# Patient Record
Sex: FEMALE | HISPANIC OR LATINO
[De-identification: names, ages, dates, MRNs, and addresses within clinical notes are randomized per-mention and may not be internally consistent; named-entity substitution may affect disease eponyms.]

---

## 2020-07-23 ENCOUNTER — TELEPHONE (OUTPATIENT)
Dept: SCHEDULING | Age: 30
End: 2020-07-23

## 2021-06-29 ENCOUNTER — WALK IN (OUTPATIENT)
Dept: URGENT CARE | Age: 31
End: 2021-06-29

## 2021-06-29 VITALS
SYSTOLIC BLOOD PRESSURE: 100 MMHG | RESPIRATION RATE: 18 BRPM | HEART RATE: 92 BPM | OXYGEN SATURATION: 99 % | DIASTOLIC BLOOD PRESSURE: 62 MMHG | TEMPERATURE: 99.4 F

## 2021-06-29 DIAGNOSIS — J01.40 ACUTE NON-RECURRENT PANSINUSITIS: Primary | ICD-10-CM

## 2021-06-29 PROCEDURE — X0943 AMG SELF PAY VISIT: HCPCS | Performed by: NURSE PRACTITIONER

## 2021-06-29 RX ORDER — AZITHROMYCIN 250 MG/1
TABLET, FILM COATED ORAL
Qty: 6 TABLET | Refills: 0 | Status: SHIPPED | OUTPATIENT
Start: 2021-06-29

## 2021-06-29 ASSESSMENT — ENCOUNTER SYMPTOMS
HEADACHES: 0
SINUS PRESSURE: 1
SINUS PAIN: 1
RHINORRHEA: 1
COUGH: 1
FEVER: 1
FATIGUE: 1

## 2021-11-18 ENCOUNTER — APPOINTMENT (OUTPATIENT)
Dept: CT IMAGING | Facility: HOSPITAL | Age: 31
End: 2021-11-18
Attending: EMERGENCY MEDICINE
Payer: MEDICAID

## 2021-11-18 ENCOUNTER — HOSPITAL ENCOUNTER (EMERGENCY)
Facility: HOSPITAL | Age: 31
Discharge: HOME | End: 2021-11-18
Attending: EMERGENCY MEDICINE
Payer: MEDICAID

## 2021-11-18 VITALS
OXYGEN SATURATION: 96 % | HEART RATE: 92 BPM | SYSTOLIC BLOOD PRESSURE: 93 MMHG | BODY MASS INDEX: 23.19 KG/M2 | TEMPERATURE: 98 F | DIASTOLIC BLOOD PRESSURE: 43 MMHG | WEIGHT: 126 LBS | RESPIRATION RATE: 16 BRPM | HEIGHT: 62 IN

## 2021-11-18 DIAGNOSIS — H81.12 BENIGN PAROXYSMAL POSITIONAL VERTIGO OF LEFT EAR: ICD-10-CM

## 2021-11-18 DIAGNOSIS — R55 SYNCOPE AND COLLAPSE: Primary | ICD-10-CM

## 2021-11-18 PROCEDURE — 81003 URINALYSIS AUTO W/O SCOPE: CPT | Performed by: EMERGENCY MEDICINE

## 2021-11-18 PROCEDURE — 70450 CT HEAD/BRAIN W/O DYE: CPT | Performed by: EMERGENCY MEDICINE

## 2021-11-18 PROCEDURE — 81025 URINE PREGNANCY TEST: CPT

## 2021-11-18 PROCEDURE — 85379 FIBRIN DEGRADATION QUANT: CPT | Performed by: EMERGENCY MEDICINE

## 2021-11-18 PROCEDURE — 93010 ELECTROCARDIOGRAM REPORT: CPT

## 2021-11-18 PROCEDURE — 84443 ASSAY THYROID STIM HORMONE: CPT | Performed by: EMERGENCY MEDICINE

## 2021-11-18 PROCEDURE — 96361 HYDRATE IV INFUSION ADD-ON: CPT

## 2021-11-18 PROCEDURE — 93005 ELECTROCARDIOGRAM TRACING: CPT

## 2021-11-18 PROCEDURE — 80307 DRUG TEST PRSMV CHEM ANLYZR: CPT | Performed by: EMERGENCY MEDICINE

## 2021-11-18 PROCEDURE — 99284 EMERGENCY DEPT VISIT MOD MDM: CPT

## 2021-11-18 PROCEDURE — 96360 HYDRATION IV INFUSION INIT: CPT

## 2021-11-18 PROCEDURE — 84484 ASSAY OF TROPONIN QUANT: CPT | Performed by: EMERGENCY MEDICINE

## 2021-11-18 PROCEDURE — 85025 COMPLETE CBC W/AUTO DIFF WBC: CPT | Performed by: EMERGENCY MEDICINE

## 2021-11-18 PROCEDURE — 80053 COMPREHEN METABOLIC PANEL: CPT | Performed by: EMERGENCY MEDICINE

## 2021-11-18 RX ORDER — MECLIZINE HYDROCHLORIDE 25 MG/1
25 TABLET ORAL ONCE
Status: COMPLETED | OUTPATIENT
Start: 2021-11-18 | End: 2021-11-18

## 2021-11-18 RX ORDER — MECLIZINE HYDROCHLORIDE 25 MG/1
25 TABLET ORAL 3 TIMES DAILY PRN
Qty: 20 TABLET | Refills: 0 | Status: SHIPPED | OUTPATIENT
Start: 2021-11-18

## 2021-11-18 NOTE — ED INITIAL ASSESSMENT (HPI)
Patient reports she had bronchitis, was walking out of the restroom at her house, starting seeing spots and passed out. Reports the last time she passed out she had a PE. Patient is not on blood thinners.    + weight loss recently, reports she was screen

## 2021-11-19 NOTE — ED PROVIDER NOTES
Patient Seen in: [facility] Emergency Department      History   Patient presents with:  Syncope    Stated Complaint: syncopal, LOC, some pain to forehead    Subjective:   HPI    22-year-old female presents to the emergency department after syncopal Drug use: Not on file             Review of Systems   All other systems reviewed and are negative. Positive for stated complaint: syncopal, LOC, some pain to forehead  Other systems are as noted in HPI. Constitutional and vital signs reviewed. Result Value    Sodium 133 (*)     Anion Gap <0 (*)     AST 13 (*)     All other components within normal limits   URINALYSIS WITH CULTURE REFLEX - Abnormal; Notable for the following components:    Clarity Urine Cloudy (*)     All other components within midline shift. There are no intraparenchymal brain abnormalities. There is nothing specific for acute infarct. There is no hemorrhage or mass lesion. SINUSES:           No sign of acute sinusitis. MASTOIDS:          No sign of acute inflammation.  SHREYAS etiology at this time. She will be given a trial of meclizine and if she has progressive symptoms or does not have improvement she is advised to follow-up with neurology.   She was subsequently discharged                             Disposition and Plan

## 2021-12-28 ENCOUNTER — TELEPHONE (OUTPATIENT)
Dept: SCHEDULING | Age: 31
End: 2021-12-28

## 2021-12-30 ENCOUNTER — HOSPITAL ENCOUNTER (EMERGENCY)
Facility: HOSPITAL | Age: 31
Discharge: HOME | End: 2021-12-30
Attending: EMERGENCY MEDICINE
Payer: MEDICAID

## 2021-12-30 ENCOUNTER — APPOINTMENT (OUTPATIENT)
Dept: GENERAL RADIOLOGY | Facility: HOSPITAL | Age: 31
End: 2021-12-30
Attending: EMERGENCY MEDICINE
Payer: MEDICAID

## 2021-12-30 VITALS
WEIGHT: 130 LBS | DIASTOLIC BLOOD PRESSURE: 59 MMHG | OXYGEN SATURATION: 99 % | BODY MASS INDEX: 23.92 KG/M2 | SYSTOLIC BLOOD PRESSURE: 93 MMHG | TEMPERATURE: 98 F | HEIGHT: 62 IN | HEART RATE: 82 BPM | RESPIRATION RATE: 24 BRPM

## 2021-12-30 DIAGNOSIS — U07.1 COVID-19: Primary | ICD-10-CM

## 2021-12-30 PROCEDURE — 99285 EMERGENCY DEPT VISIT HI MDM: CPT | Performed by: EMERGENCY MEDICINE

## 2021-12-30 PROCEDURE — 99284 EMERGENCY DEPT VISIT MOD MDM: CPT | Performed by: EMERGENCY MEDICINE

## 2021-12-30 PROCEDURE — 71045 X-RAY EXAM CHEST 1 VIEW: CPT | Performed by: EMERGENCY MEDICINE

## 2021-12-30 RX ORDER — ONDANSETRON 4 MG/1
4 TABLET, ORALLY DISINTEGRATING ORAL EVERY 4 HOURS PRN
Qty: 10 TABLET | Refills: 0 | Status: SHIPPED | OUTPATIENT
Start: 2021-12-30 | End: 2022-01-06

## 2021-12-30 RX ORDER — HYDROCODONE BITARTRATE AND ACETAMINOPHEN 5; 325 MG/1; MG/1
1 TABLET ORAL ONCE
Status: COMPLETED | OUTPATIENT
Start: 2021-12-30 | End: 2021-12-30

## 2021-12-30 RX ORDER — HYDROCODONE BITARTRATE AND ACETAMINOPHEN 5; 325 MG/1; MG/1
1-2 TABLET ORAL EVERY 4 HOURS PRN
Qty: 20 TABLET | Refills: 0 | Status: SHIPPED | OUTPATIENT
Start: 2021-12-30 | End: 2022-01-06

## 2021-12-31 NOTE — ED PROVIDER NOTES
Patient Seen in: [facility] Emergency Department      History   Patient presents with:  Covid  Difficulty Breathing    Stated Complaint: covid symptoms x 3 days, + yesterday,    Subjective:   HPI    33-year-old female presents emergency department w gloves were worn throughout the duration of the exam.  Handwashing was performed prior to and after the exam.  Stethoscope and any equipment used during my examination was cleaned with super sani-cloth germicidal wipes following the exam.         Vital sig 10:34 PM       Patient's x-ray was unremarkable. She was feeling better after the [place] so we will send her home with some Zofran some pain medication and she should continue pushing fluids.   Told her hopefully she will feel better in a few days but if sh tablet (4 mg total) by mouth every 4 (four) hours as needed for Nausea.   Qty: 10 tablet Refills: 0

## 2021-12-31 NOTE — ED INITIAL ASSESSMENT (HPI)
Patient here with known exposure to Covid, tested positive yesterday, cough, body aches, headache, dizziness with standing, fever, symptoms started 3 days ago. Taking Tylenol for fevers, last dose 1 hour ago.  Tmax 102.3

## 2024-02-23 ENCOUNTER — TELEPHONE (OUTPATIENT)
Age: 34
End: 2024-02-23

## 2024-02-29 ENCOUNTER — TELEPHONE (OUTPATIENT)
Age: 34
End: 2024-02-29

## 2024-03-02 ENCOUNTER — LAB ENCOUNTER (OUTPATIENT)
Dept: LAB | Age: 34
End: 2024-03-02
Attending: STUDENT IN AN ORGANIZED HEALTH CARE EDUCATION/TRAINING PROGRAM
Payer: COMMERCIAL

## 2024-03-02 DIAGNOSIS — H02.9 EYELID ABNORMALITY: ICD-10-CM

## 2024-03-02 DIAGNOSIS — F41.9 ANXIETY: ICD-10-CM

## 2024-03-02 DIAGNOSIS — Z13.220 SCREENING CHOLESTEROL LEVEL: ICD-10-CM

## 2024-03-02 LAB
ALBUMIN SERPL-MCNC: 4 G/DL (ref 3.4–5)
ALBUMIN/GLOB SERPL: 1.3 {RATIO} (ref 1–2)
ALP LIVER SERPL-CCNC: 70 U/L
ALT SERPL-CCNC: 17 U/L
ANION GAP SERPL CALC-SCNC: 3 MMOL/L (ref 0–18)
AST SERPL-CCNC: 13 U/L (ref 15–37)
BASOPHILS # BLD AUTO: 0.04 X10(3) UL (ref 0–0.2)
BASOPHILS NFR BLD AUTO: 0.8 %
BILIRUB SERPL-MCNC: 0.9 MG/DL (ref 0.1–2)
BUN BLD-MCNC: 9 MG/DL (ref 9–23)
CALCIUM BLD-MCNC: 9.1 MG/DL (ref 8.5–10.1)
CHLORIDE SERPL-SCNC: 109 MMOL/L (ref 98–112)
CHOLEST SERPL-MCNC: 201 MG/DL (ref ?–200)
CO2 SERPL-SCNC: 27 MMOL/L (ref 21–32)
CREAT BLD-MCNC: 0.69 MG/DL
EGFRCR SERPLBLD CKD-EPI 2021: 119 ML/MIN/1.73M2 (ref 60–?)
EOSINOPHIL # BLD AUTO: 0.12 X10(3) UL (ref 0–0.7)
EOSINOPHIL NFR BLD AUTO: 2.5 %
ERYTHROCYTE [DISTWIDTH] IN BLOOD BY AUTOMATED COUNT: 12.8 %
FASTING PATIENT LIPID ANSWER: NO
FASTING STATUS PATIENT QL REPORTED: NO
GLOBULIN PLAS-MCNC: 3.2 G/DL (ref 2.8–4.4)
GLUCOSE BLD-MCNC: 88 MG/DL (ref 70–99)
HCT VFR BLD AUTO: 39.5 %
HDLC SERPL-MCNC: 69 MG/DL (ref 40–59)
HGB BLD-MCNC: 12.8 G/DL
IMM GRANULOCYTES # BLD AUTO: 0 X10(3) UL (ref 0–1)
IMM GRANULOCYTES NFR BLD: 0 %
LDLC SERPL CALC-MCNC: 115 MG/DL (ref ?–100)
LYMPHOCYTES # BLD AUTO: 1.72 X10(3) UL (ref 1–4)
LYMPHOCYTES NFR BLD AUTO: 35.9 %
MCH RBC QN AUTO: 30.3 PG (ref 26–34)
MCHC RBC AUTO-ENTMCNC: 32.4 G/DL (ref 31–37)
MCV RBC AUTO: 93.6 FL
MONOCYTES # BLD AUTO: 0.49 X10(3) UL (ref 0.1–1)
MONOCYTES NFR BLD AUTO: 10.2 %
NEUTROPHILS # BLD AUTO: 2.42 X10 (3) UL (ref 1.5–7.7)
NEUTROPHILS # BLD AUTO: 2.42 X10(3) UL (ref 1.5–7.7)
NEUTROPHILS NFR BLD AUTO: 50.6 %
NONHDLC SERPL-MCNC: 132 MG/DL (ref ?–130)
OSMOLALITY SERPL CALC.SUM OF ELEC: 286 MOSM/KG (ref 275–295)
PLATELET # BLD AUTO: 299 10(3)UL (ref 150–450)
POTASSIUM SERPL-SCNC: 3.8 MMOL/L (ref 3.5–5.1)
PROT SERPL-MCNC: 7.2 G/DL (ref 6.4–8.2)
RBC # BLD AUTO: 4.22 X10(6)UL
SODIUM SERPL-SCNC: 139 MMOL/L (ref 136–145)
TRIGL SERPL-MCNC: 98 MG/DL (ref 30–149)
TSI SER-ACNC: 3.52 MIU/ML (ref 0.36–3.74)
VLDLC SERPL CALC-MCNC: 17 MG/DL (ref 0–30)
WBC # BLD AUTO: 4.8 X10(3) UL (ref 4–11)

## 2024-03-02 PROCEDURE — 84443 ASSAY THYROID STIM HORMONE: CPT

## 2024-03-02 PROCEDURE — 36415 COLL VENOUS BLD VENIPUNCTURE: CPT

## 2024-03-02 PROCEDURE — 80061 LIPID PANEL: CPT

## 2024-03-02 PROCEDURE — 85025 COMPLETE CBC W/AUTO DIFF WBC: CPT

## 2024-03-02 PROCEDURE — 80053 COMPREHEN METABOLIC PANEL: CPT

## 2024-03-04 ENCOUNTER — TELEPHONE (OUTPATIENT)
Age: 34
End: 2024-03-04

## 2024-03-05 ENCOUNTER — TELEPHONE (OUTPATIENT)
Age: 34
End: 2024-03-05

## 2024-03-05 NOTE — TELEPHONE ENCOUNTER
Lala Barajas  [address] [place] [address]  Phone: [phone]    Marge Tenorio  [address]  [place] [address]  Phone: [phone]    Gris Jensen   [address]  [place] [address]  Phone: [phone]    Aaliyah Sanchez  [address]  [place] [address]  Phone: [phone]    Vipul Youssef  [address]  [place] [address]  Phone: [phone]

## 2024-04-15 DIAGNOSIS — F41.9 ANXIETY: ICD-10-CM

## 2024-04-15 RX ORDER — BUSPIRONE HYDROCHLORIDE 5 MG/1
5 TABLET ORAL 2 TIMES DAILY
Qty: 60 TABLET | Refills: 0 | OUTPATIENT
Start: 2024-04-15

## 2024-04-15 NOTE — TELEPHONE ENCOUNTER
Requesting Buspirone 5mg BID  Last OV: 3/4/24  RTC: 1 month  Last Rx'd 2/17/24 #60 with 0 refills    No future appointments.    Rx was increased to 10mg on 3/4/24. Request denied.

## 2024-08-28 ENCOUNTER — HOSPITAL ENCOUNTER (EMERGENCY)
Age: 34
Discharge: HOME | End: 2024-08-28
Attending: STUDENT IN AN ORGANIZED HEALTH CARE EDUCATION/TRAINING PROGRAM

## 2024-08-28 ENCOUNTER — APPOINTMENT (OUTPATIENT)
Dept: GENERAL RADIOLOGY | Age: 34
End: 2024-08-28
Attending: EMERGENCY MEDICINE

## 2024-08-28 ENCOUNTER — APPOINTMENT (OUTPATIENT)
Dept: CT IMAGING | Age: 34
End: 2024-08-28
Attending: STUDENT IN AN ORGANIZED HEALTH CARE EDUCATION/TRAINING PROGRAM

## 2024-08-28 ENCOUNTER — WALK IN (OUTPATIENT)
Dept: URGENT CARE | Age: 34
End: 2024-08-28
Attending: EMERGENCY MEDICINE

## 2024-08-28 VITALS
DIASTOLIC BLOOD PRESSURE: 67 MMHG | RESPIRATION RATE: 16 BRPM | SYSTOLIC BLOOD PRESSURE: 102 MMHG | HEART RATE: 96 BPM | OXYGEN SATURATION: 99 % | WEIGHT: 128 LBS | TEMPERATURE: 98.1 F

## 2024-08-28 VITALS
OXYGEN SATURATION: 100 % | BODY MASS INDEX: 23.55 KG/M2 | TEMPERATURE: 99.2 F | HEART RATE: 96 BPM | RESPIRATION RATE: 18 BRPM | SYSTOLIC BLOOD PRESSURE: 90 MMHG | DIASTOLIC BLOOD PRESSURE: 58 MMHG | HEIGHT: 62 IN | WEIGHT: 128 LBS

## 2024-08-28 DIAGNOSIS — R10.13 EPIGASTRIC PAIN: ICD-10-CM

## 2024-08-28 DIAGNOSIS — Z20.822 SUSPECTED COVID-19 VIRUS INFECTION: Primary | ICD-10-CM

## 2024-08-28 DIAGNOSIS — K29.70 GASTRITIS WITHOUT BLEEDING, UNSPECIFIED CHRONICITY, UNSPECIFIED GASTRITIS TYPE: ICD-10-CM

## 2024-08-28 DIAGNOSIS — R10.13 EPIGASTRIC PAIN: Primary | ICD-10-CM

## 2024-08-28 DIAGNOSIS — R50.9 FEVER, UNSPECIFIED FEVER CAUSE: ICD-10-CM

## 2024-08-28 LAB
ALBUMIN SERPL-MCNC: 3.5 G/DL (ref 3.6–5.1)
ALBUMIN/GLOB SERPL: 1.1 {RATIO} (ref 1–2.4)
ALP SERPL-CCNC: 70 UNITS/L (ref 45–117)
ALT SERPL-CCNC: 15 UNITS/L
ANION GAP SERPL CALC-SCNC: 9 MMOL/L (ref 7–19)
APPEARANCE UR: CLEAR
AST SERPL-CCNC: 13 UNITS/L
B-HCG UR QL: NEGATIVE
BASOPHILS # BLD: 0 K/MCL (ref 0–0.3)
BASOPHILS NFR BLD: 1 %
BILIRUB SERPL-MCNC: 0.2 MG/DL (ref 0.2–1)
BILIRUB UR QL STRIP: NEGATIVE
BUN SERPL-MCNC: 7 MG/DL (ref 6–20)
BUN/CREAT SERPL: 8 (ref 7–25)
CALCIUM SERPL-MCNC: 8.6 MG/DL (ref 8.4–10.2)
CHLORIDE SERPL-SCNC: 110 MMOL/L (ref 97–110)
CO2 SERPL-SCNC: 26 MMOL/L (ref 21–32)
COLOR UR: NORMAL
CREAT SERPL-MCNC: 0.85 MG/DL (ref 0.51–0.95)
DEPRECATED RDW RBC: 41.7 FL (ref 39–50)
EGFRCR SERPLBLD CKD-EPI 2021: >90 ML/MIN/{1.73_M2}
EOSINOPHIL # BLD: 0.1 K/MCL (ref 0–0.5)
EOSINOPHIL NFR BLD: 1 %
ERYTHROCYTE [DISTWIDTH] IN BLOOD: 12.5 % (ref 11–15)
FASTING DURATION TIME PATIENT: ABNORMAL H
FLUAV RNA RESP QL NAA+PROBE: NOT DETECTED
FLUBV RNA RESP QL NAA+PROBE: NOT DETECTED
GLOBULIN SER-MCNC: 3.2 G/DL (ref 2–4)
GLUCOSE SERPL-MCNC: 91 MG/DL (ref 70–99)
GLUCOSE UR STRIP-MCNC: NEGATIVE MG/DL
HCT VFR BLD CALC: 33.7 % (ref 36–46.5)
HGB BLD-MCNC: 11.3 G/DL (ref 12–15.5)
HGB UR QL STRIP: NEGATIVE
IMM GRANULOCYTES # BLD AUTO: 0 K/MCL (ref 0–0.2)
IMM GRANULOCYTES # BLD: 0 %
KETONES UR STRIP-MCNC: NEGATIVE MG/DL
LEUKOCYTE ESTERASE UR QL STRIP: NEGATIVE
LIPASE SERPL-CCNC: 60 UNITS/L (ref 15–77)
LYMPHOCYTES # BLD: 1.5 K/MCL (ref 1–4.8)
LYMPHOCYTES NFR BLD: 24 %
MCH RBC QN AUTO: 30.6 PG (ref 26–34)
MCHC RBC AUTO-ENTMCNC: 33.5 G/DL (ref 32–36.5)
MCV RBC AUTO: 91.3 FL (ref 78–100)
MONOCYTES # BLD: 0.7 K/MCL (ref 0.3–0.9)
MONOCYTES NFR BLD: 12 %
NEUTROPHILS # BLD: 3.8 K/MCL (ref 1.8–7.7)
NEUTROPHILS NFR BLD: 62 %
NITRITE UR QL STRIP: NEGATIVE
NRBC BLD MANUAL-RTO: 0 /100 WBC
PH UR STRIP: 6.5 [PH] (ref 5–7)
PLATELET # BLD AUTO: 242 K/MCL (ref 140–450)
POTASSIUM SERPL-SCNC: 3.8 MMOL/L (ref 3.4–5.1)
PROT SERPL-MCNC: 6.7 G/DL (ref 6.4–8.2)
PROT UR STRIP-MCNC: NEGATIVE MG/DL
RBC # BLD: 3.69 MIL/MCL (ref 4–5.2)
RSV AG NPH QL IA.RAPID: NOT DETECTED
SARS-COV-2 RNA RESP QL NAA+PROBE: NOT DETECTED
SODIUM SERPL-SCNC: 141 MMOL/L (ref 135–145)
SP GR UR STRIP: 1.02 (ref 1–1.03)
UROBILINOGEN UR STRIP-MCNC: 0.2 MG/DL
WBC # BLD: 6.1 K/MCL (ref 4.2–11)

## 2024-08-28 PROCEDURE — 81025 URINE PREGNANCY TEST: CPT | Performed by: EMERGENCY MEDICINE

## 2024-08-28 PROCEDURE — 74177 CT ABD & PELVIS W/CONTRAST: CPT

## 2024-08-28 PROCEDURE — 81003 URINALYSIS AUTO W/O SCOPE: CPT | Performed by: EMERGENCY MEDICINE

## 2024-08-28 PROCEDURE — 96361 HYDRATE IV INFUSION ADD-ON: CPT

## 2024-08-28 PROCEDURE — 10002800 HB RX 250 W HCPCS: Performed by: STUDENT IN AN ORGANIZED HEALTH CARE EDUCATION/TRAINING PROGRAM

## 2024-08-28 PROCEDURE — 80053 COMPREHEN METABOLIC PANEL: CPT | Performed by: STUDENT IN AN ORGANIZED HEALTH CARE EDUCATION/TRAINING PROGRAM

## 2024-08-28 PROCEDURE — 0241U POCT COVID/FLU/RSV PANEL: CPT | Performed by: EMERGENCY MEDICINE

## 2024-08-28 PROCEDURE — 10002807 HB RX 258: Performed by: STUDENT IN AN ORGANIZED HEALTH CARE EDUCATION/TRAINING PROGRAM

## 2024-08-28 PROCEDURE — 71046 X-RAY EXAM CHEST 2 VIEWS: CPT

## 2024-08-28 PROCEDURE — 96374 THER/PROPH/DIAG INJ IV PUSH: CPT

## 2024-08-28 PROCEDURE — 10002805 HB CONTRAST AGENT: Performed by: STUDENT IN AN ORGANIZED HEALTH CARE EDUCATION/TRAINING PROGRAM

## 2024-08-28 PROCEDURE — 10002801 HB RX 250 W/O HCPCS: Performed by: STUDENT IN AN ORGANIZED HEALTH CARE EDUCATION/TRAINING PROGRAM

## 2024-08-28 PROCEDURE — 99285 EMERGENCY DEPT VISIT HI MDM: CPT

## 2024-08-28 PROCEDURE — 96375 TX/PRO/DX INJ NEW DRUG ADDON: CPT

## 2024-08-28 PROCEDURE — 85025 COMPLETE CBC W/AUTO DIFF WBC: CPT | Performed by: STUDENT IN AN ORGANIZED HEALTH CARE EDUCATION/TRAINING PROGRAM

## 2024-08-28 PROCEDURE — 83690 ASSAY OF LIPASE: CPT | Performed by: STUDENT IN AN ORGANIZED HEALTH CARE EDUCATION/TRAINING PROGRAM

## 2024-08-28 RX ORDER — KETOROLAC TROMETHAMINE 15 MG/ML
15 INJECTION, SOLUTION INTRAMUSCULAR; INTRAVENOUS ONCE
Status: COMPLETED | OUTPATIENT
Start: 2024-08-28 | End: 2024-08-28

## 2024-08-28 RX ORDER — ONDANSETRON 2 MG/ML
4 INJECTION INTRAMUSCULAR; INTRAVENOUS ONCE
Status: COMPLETED | OUTPATIENT
Start: 2024-08-28 | End: 2024-08-28

## 2024-08-28 RX ORDER — BUSPIRONE HYDROCHLORIDE 10 MG/1
TABLET ORAL
COMMUNITY

## 2024-08-28 RX ORDER — FAMOTIDINE 10 MG/ML
20 INJECTION, SOLUTION INTRAVENOUS ONCE
Status: COMPLETED | OUTPATIENT
Start: 2024-08-28 | End: 2024-08-28

## 2024-08-28 RX ORDER — FAMOTIDINE 40 MG/1
40 TABLET, FILM COATED ORAL DAILY
Qty: 30 TABLET | Refills: 0 | Status: SHIPPED | OUTPATIENT
Start: 2024-08-28 | End: 2024-09-27

## 2024-08-28 RX ADMIN — FAMOTIDINE 20 MG: 10 INJECTION INTRAVENOUS at 16:33

## 2024-08-28 RX ADMIN — IOHEXOL 75 ML: 350 INJECTION, SOLUTION INTRAVENOUS at 17:44

## 2024-08-28 RX ADMIN — KETOROLAC TROMETHAMINE 15 MG: 15 INJECTION, SOLUTION INTRAMUSCULAR; INTRAVENOUS at 16:32

## 2024-08-28 RX ADMIN — Medication 15 ML: at 18:50

## 2024-08-28 RX ADMIN — ONDANSETRON 4 MG: 2 INJECTION INTRAMUSCULAR; INTRAVENOUS at 16:30

## 2024-08-28 RX ADMIN — SODIUM CHLORIDE 1000 ML: 9 INJECTION, SOLUTION INTRAVENOUS at 16:28

## 2024-08-28 ASSESSMENT — ENCOUNTER SYMPTOMS
NAUSEA: 1
FEVER: 1
DIARRHEA: 1
ABDOMINAL PAIN: 1
COUGH: 1

## 2024-08-28 ASSESSMENT — PAIN SCALES - GENERAL
PAINLEVEL_OUTOF10: 7
PAINLEVEL_OUTOF10: 8
PAINLEVEL: 7

## 2024-08-28 ASSESSMENT — PAIN SCALES - WONG BAKER: WONGBAKER_NUMERICALRESPONSE: 7

## 2024-08-29 LAB
RAINBOW EXTRA TUBES HOLD SPECIMEN: NORMAL
RAINBOW EXTRA TUBES HOLD SPECIMEN: NORMAL

## 2024-09-05 ENCOUNTER — OFFICE VISIT (OUTPATIENT)
Dept: FAMILY MEDICINE CLINIC | Facility: CLINIC | Age: 34
End: 2024-09-05
Payer: COMMERCIAL

## 2024-09-05 VITALS
SYSTOLIC BLOOD PRESSURE: 102 MMHG | HEIGHT: 62 IN | WEIGHT: 137 LBS | DIASTOLIC BLOOD PRESSURE: 70 MMHG | HEART RATE: 62 BPM | RESPIRATION RATE: 16 BRPM | BODY MASS INDEX: 25.21 KG/M2 | TEMPERATURE: 97 F | OXYGEN SATURATION: 97 %

## 2024-09-05 DIAGNOSIS — E86.0 DEHYDRATION: ICD-10-CM

## 2024-09-05 DIAGNOSIS — D21.9 FIBROIDS: ICD-10-CM

## 2024-09-05 DIAGNOSIS — K29.00 ACUTE GASTRITIS WITHOUT HEMORRHAGE, UNSPECIFIED GASTRITIS TYPE: Primary | ICD-10-CM

## 2024-09-05 DIAGNOSIS — J01.90 ACUTE NON-RECURRENT SINUSITIS, UNSPECIFIED LOCATION: ICD-10-CM

## 2024-09-05 DIAGNOSIS — M54.9 ACUTE MID BACK PAIN: ICD-10-CM

## 2024-09-05 DIAGNOSIS — K76.9 LIVER LESION: ICD-10-CM

## 2024-09-05 DIAGNOSIS — M62.830 PARASPINAL MUSCLE SPASM: ICD-10-CM

## 2024-09-05 PROCEDURE — 3074F SYST BP LT 130 MM HG: CPT | Performed by: STUDENT IN AN ORGANIZED HEALTH CARE EDUCATION/TRAINING PROGRAM

## 2024-09-05 PROCEDURE — 99214 OFFICE O/P EST MOD 30 MIN: CPT | Performed by: STUDENT IN AN ORGANIZED HEALTH CARE EDUCATION/TRAINING PROGRAM

## 2024-09-05 PROCEDURE — 3008F BODY MASS INDEX DOCD: CPT | Performed by: STUDENT IN AN ORGANIZED HEALTH CARE EDUCATION/TRAINING PROGRAM

## 2024-09-05 PROCEDURE — 3078F DIAST BP <80 MM HG: CPT | Performed by: STUDENT IN AN ORGANIZED HEALTH CARE EDUCATION/TRAINING PROGRAM

## 2024-09-05 RX ORDER — CYCLOBENZAPRINE HCL 5 MG
5 TABLET ORAL 3 TIMES DAILY PRN
Qty: 30 TABLET | Refills: 0 | Status: SHIPPED | OUTPATIENT
Start: 2024-09-05 | End: 2024-09-12

## 2024-09-05 RX ORDER — ACETAMINOPHEN AND CODEINE PHOSPHATE 300; 60 MG/1; MG/1
1 TABLET ORAL EVERY 6 HOURS PRN
Qty: 20 TABLET | Refills: 0 | Status: SHIPPED | OUTPATIENT
Start: 2024-09-05

## 2024-09-05 RX ORDER — PANTOPRAZOLE SODIUM 40 MG/1
40 TABLET, DELAYED RELEASE ORAL
Qty: 60 TABLET | Refills: 0 | Status: SHIPPED | OUTPATIENT
Start: 2024-09-05 | End: 2024-11-04

## 2024-09-05 NOTE — PROGRESS NOTES
Subjective:      Chief Complaint   Patient presents with    ER F/U     Went to [facility] and was informed might be GERD  States she has been sick for over a month and it doesn't go away - states right now she has a cough, fevers on and off, nasal congestion. Some days she is unable to eat and some vomiting. States she is constantly sick.    Back Pain     Left side back pain     HISTORY OF PRESENT ILLNESS  HPI  HPI obtained per patient report.  Giuliana Oconnor is a pleasant 33 year old female presenting for follow-up after a recent ER visit.   She states that she has had epigastric pain, nausea, back pain, fatigue, fever, nasal congestion, cough, LH, and FOX for over 1 month.   She reports that her epigastric pain is not associated with eating, but her nausea is triggered by eating. She denies any vomiting, heartburn, or stool changes. She has only been taking peptobismol without adequate relief.   She reports L mid back tenderness to palpation. She states that she has been taking ibuprofen frequently and for an extended period of time for her back pain.  She notes that her nasal discharge and sputum has been thick and clear or yellow.   She was seen at [facility] ER on 8/28/24. CXR was negative, but CT abdomen/pelvis showed thickening of the lower esophagus, indeterminate liver lesions, and possible uterine fibroids.       PAST PATIENT HISTORY  Past Medical History:    Anxiety    Pulmonary embolism (HCC)     Past Surgical History:   Procedure Laterality Date    Appendectomy      Eye surgery      cataract    Removal gallbladder         CURRENT MEDICATIONS  Outpatient Medications Marked as Taking for the 9/5/24 encounter (Office Visit) with Billy Biswas MD   Medication Sig Dispense Refill    pantoprazole 40 MG Oral Tab EC Take 1 tablet (40 mg total) by mouth every morning before breakfast. 60 tablet 0    amoxicillin clavulanate 875-125 MG Oral Tab Take 1 tablet by mouth 2 (two) times daily for 7 days. 14 tablet 0     acetaminophen-codeine 300-60 MG Oral Tab Take 1 tablet by mouth every 6 (six) hours as needed for Pain. 20 tablet 0    cyclobenzaprine 5 MG Oral Tab Take 1 tablet (5 mg total) by mouth 3 (three) times daily as needed for Muscle spasms. 30 tablet 0    busPIRone 10 MG Oral Tab Take 1 tablet (10 mg total) by mouth in the morning and 1 tablet (10 mg total) before bedtime. 180 tablet 3    ALPRAZolam (XANAX) 0.25 MG Oral Tab Take 1 tablet (0.25 mg total) by mouth daily as needed for Anxiety. 30 tablet 3    hydrOXYzine 25 MG Oral Tab Take 1 tablet (25 mg total) by mouth nightly as needed for Anxiety. 90 tablet 3    Multiple Vitamin (MULTIVITAMIN ADULT OR) Take 1 tablet by mouth daily.         HEALTH MAINTENANCE  Immunization History   Administered Date(s) Administered    >=3 YRS TRI  MULTIDOSE VIAL (24980) FLU CLINIC 10/24/2008    Covid-19 Vaccine Pfizer 30 mcg/0.3 ml 09/13/2021, 11/03/2021    DTAP 07/04/2013    Influenza 10/27/2015, 10/14/2016    TDAP 10/27/2015, 10/25/2016    Td, Preserv Free 03/28/2007       ALLERGIES AND DRUG REACTIONS  No Known Allergies    Family History   Problem Relation Age of Onset    Other (Melanoma) Mother     Other (Melanoma) Maternal Grandmother     Asthma Maternal Grandmother     Diabetes Maternal Grandmother     Cancer Maternal Grandmother         Lung cancer    Breast Cancer Paternal Grandmother      Social History     Socioeconomic History    Marital status:    Tobacco Use    Smoking status: Never    Smokeless tobacco: Never   Vaping Use    Vaping status: Never Used   Substance and Sexual Activity    Alcohol use: Not Currently     Comment: once every 3 months    Drug use: Never     Social Determinants of Health     Financial Resource Strain: Not on File (10/6/2022)    Received from [facility]    Financial Resource Strain     Financial Resource Strain: 0   Food Insecurity: Not on File (5/30/2023)    Received from [facility]    Food Insecurity     Food: 0   Transportation Needs: Not on  File (5/30/2023)    Received from [facility]    Transportation Needs     Transportation: 0   Physical Activity: Not on File (10/6/2022)    Received from [facility]    Physical Activity     Physical Activity: 0   Stress: Not on File (10/6/2022)    Received from [facility]    Stress     Stress: 0   Social Connections: Not on File (10/6/2022)    Received from [facility]    Social Connections     Social Connections and Isolation: 0    [facility]       Review of Systems   Constitutional:  Positive for fatigue and fever.   HENT:  Positive for congestion.    Respiratory:  Positive for cough and shortness of breath.    Gastrointestinal:  Positive for abdominal pain and nausea.   Musculoskeletal:  Positive for back pain.   Neurological:  Positive for light-headedness.   All other systems reviewed and are negative.         Objective:      /70   Pulse 62   Temp 97.3 °F (36.3 °C) (Temporal)   Resp 16   Ht 5' 2\" (1.575 m)   Wt 137 lb (62.1 kg)   LMP 08/14/2024 (Exact Date)   SpO2 97%   BMI 25.06 kg/m²   Body mass index is 25.06 kg/m².    Physical Exam  Vitals reviewed.   Constitutional:       General: She is not in acute distress.     Appearance: She is not ill-appearing, toxic-appearing or diaphoretic.   HENT:      Head: Normocephalic and atraumatic.      Mouth/Throat:      Mouth: Mucous membranes are moist.      Pharynx: Oropharynx is clear. No oropharyngeal exudate or posterior oropharyngeal erythema.   Eyes:      General: No scleral icterus.        Right eye: No discharge.         Left eye: No discharge.      Extraocular Movements: Extraocular movements intact.      Conjunctiva/sclera: Conjunctivae normal.   Cardiovascular:      Rate and Rhythm: Normal rate and regular rhythm.      Heart sounds: Normal heart sounds.   Pulmonary:      Effort: Pulmonary effort is normal.      Breath sounds: Normal breath sounds.   Abdominal:      General: Abdomen is flat. Bowel sounds  are normal. There is no distension.      Palpations: Abdomen is soft. There is no mass.      Tenderness: There is no abdominal tenderness. There is no guarding or rebound.      Hernia: No hernia is present.   Musculoskeletal:         General: Tenderness (L thoracic paraspinal spasm with tenderness) present. No swelling or deformity. Normal range of motion.      Cervical back: Neck supple. No tenderness.      Right lower leg: No edema.      Left lower leg: No edema.   Lymphadenopathy:      Cervical: Cervical adenopathy (mild submandibular LAD) present.   Neurological:      Mental Status: She is alert and oriented to person, place, and time.   Psychiatric:         Mood and Affect: Mood normal.            Assessment and Plan:      1. Acute gastritis without hemorrhage, unspecified gastritis type (Primary)  -     Pantoprazole Sodium; Take 1 tablet (40 mg total) by mouth every morning before breakfast.  Dispense: 60 tablet; Refill: 0  2. Dehydration  3. Liver lesion  -     US ABDOMEN LIMITED (CPT=76705); Future; Expected date: 09/05/2024  4. Fibroids  -     OBG - INTERNAL  5. Acute mid back pain  -     Acetaminophen-Codeine; Take 1 tablet by mouth every 6 (six) hours as needed for Pain.  Dispense: 20 tablet; Refill: 0  -     Cyclobenzaprine HCl; Take 1 tablet (5 mg total) by mouth 3 (three) times daily as needed for Muscle spasms.  Dispense: 30 tablet; Refill: 0  6. Paraspinal muscle spasm  -     Acetaminophen-Codeine; Take 1 tablet by mouth every 6 (six) hours as needed for Pain.  Dispense: 20 tablet; Refill: 0  -     Cyclobenzaprine HCl; Take 1 tablet (5 mg total) by mouth 3 (three) times daily as needed for Muscle spasms.  Dispense: 30 tablet; Refill: 0  7. Acute non-recurrent sinusitis, unspecified location  -     Amoxicillin-Pot Clavulanate; Take 1 tablet by mouth 2 (two) times daily for 7 days.  Dispense: 14 tablet; Refill: 0    Return if symptoms worsen or fail to improve.    Gastritis  - with possible  esophagitis  - possibly exacerbated by her recent frequent and extended course of ibuprofen   - recommended pantoprazole as prescribed for an 8 week course  - she is likely experiencing lightheadedness due to dehydration. Recommended increasing her fluid intake  - she declined zofran or reglan for her nausea at this time, as she reports experiencing headaches when taking these medications    Indeterminate liver lesions  - present on CT  - recommended liver US for further evaluation     Uterine fibroids  - she is experiencing irregular vaginal bleeding and cramping during menstrual periods and does not wish to take OCPs  - will refer to Gyne     Thoracic paraspinal spasm/strain  - recommended discontinuation of NSAIDs given her current gastritis  - recommended local heat application and gentle stretching  - we discussed that she may take tylenol or tylenol-3 as needed for pain  - we discussed that she may take flexeril as needed for muscle spasm  - discussed the R/B/A of tylenol-3 and flexeril including possible dizziness and drowsiness and recommended against driving while taking these medications    Sinusitis  - prolonged symptoms  - recommended a course of augmentin as prescribed    Patient verbalized understanding of assessment and recommendations. All questions and concerns were addressed.    Electronically signed by Billy Biswas MD

## 2024-09-06 ENCOUNTER — TELEPHONE (OUTPATIENT)
Dept: FAMILY MEDICINE CLINIC | Facility: CLINIC | Age: 34
End: 2024-09-06

## 2024-09-25 ENCOUNTER — HOSPITAL ENCOUNTER (OUTPATIENT)
Dept: ULTRASOUND IMAGING | Age: 34
Discharge: HOME | End: 2024-09-25
Attending: STUDENT IN AN ORGANIZED HEALTH CARE EDUCATION/TRAINING PROGRAM
Payer: COMMERCIAL

## 2024-09-25 DIAGNOSIS — K76.9 LIVER LESION: ICD-10-CM

## 2024-09-25 PROCEDURE — 76700 US EXAM ABDOM COMPLETE: CPT | Performed by: STUDENT IN AN ORGANIZED HEALTH CARE EDUCATION/TRAINING PROGRAM

## 2024-09-26 ENCOUNTER — PATIENT MESSAGE (OUTPATIENT)
Dept: FAMILY MEDICINE CLINIC | Facility: CLINIC | Age: 34
End: 2024-09-26

## 2024-09-26 NOTE — PROGRESS NOTES
Duglas Giordano,     Your liver ultrasound showed 2 small cysts, appearing consistent with hemangiomas which are benign cysts. No further evaluation is required for these findings. Please continue with our current plan of care as we discussed during your visit.     Dr. Biswas

## 2024-09-27 NOTE — TELEPHONE ENCOUNTER
From: Giuliana Oconnor  To: Billy Biswas  Sent: 9/26/2024 5:26 PM CDT  Subject: Cysts    That’s good to hear! Now as far as my stomach goes. Will I get an endoscopy of my stomach for the lesions as well?    Quality 226: Preventive Care And Screening: Tobacco Use: Screening And Cessation Intervention: Patient screened for tobacco use and is an ex/non-smoker Quality 130: Documentation Of Current Medications In The Medical Record: Current Medications Documented Detail Level: Detailed Quality 431: Preventive Care And Screening: Unhealthy Alcohol Use - Screening: Patient not identified as an unhealthy alcohol user when screened for unhealthy alcohol use using a systematic screening method

## 2024-11-26 ENCOUNTER — TELEMEDICINE (OUTPATIENT)
Dept: FAMILY MEDICINE CLINIC | Facility: CLINIC | Age: 34
End: 2024-11-26
Payer: COMMERCIAL

## 2024-11-26 DIAGNOSIS — J02.9 SORE THROAT: Primary | ICD-10-CM

## 2024-11-26 PROCEDURE — 99213 OFFICE O/P EST LOW 20 MIN: CPT | Performed by: FAMILY MEDICINE

## 2024-11-26 PROCEDURE — G2211 COMPLEX E/M VISIT ADD ON: HCPCS | Performed by: FAMILY MEDICINE

## 2024-11-26 RX ORDER — AZITHROMYCIN 250 MG/1
TABLET, FILM COATED ORAL
Qty: 6 TABLET | Refills: 0 | Status: SHIPPED | OUTPATIENT
Start: 2024-11-26 | End: 2024-12-01

## 2024-11-26 NOTE — PROGRESS NOTES
Subjective:   Patient ID: Giuliana Oconnor is a 33 year old female.    HPI  Ms. Oconnor is a pleasant 33-year-old female with history of anxiety presenting for video visit today for sore throat which started yesterday.  This is associated with cough productive of phlegm and fever.  She feels tired.  She noticed white spots in the back of her throat.  She has had strep throat in the past and feels that this is the case.  Her children also was seen by other doctors and was diagnosed with strep and are on antibiotics.  She does not report nausea, vomiting, abdominal pain, diarrhea.    I had reviewed past medical and family histories together with allergy and medication lists documented.    History/Other:   Review of Systems   Gastrointestinal:  Negative for abdominal pain, nausea and vomiting.     Current Outpatient Medications   Medication Sig Dispense Refill    azithromycin (ZITHROMAX Z-NENITA) 250 MG Oral Tab Take 2 tablets (500 mg total) by mouth daily for 1 day, THEN 1 tablet (250 mg total) daily for 4 days. 6 tablet 0    acetaminophen-codeine 300-60 MG Oral Tab Take 1 tablet by mouth every 6 (six) hours as needed for Pain. 20 tablet 0    busPIRone 10 MG Oral Tab Take 1 tablet (10 mg total) by mouth in the morning and 1 tablet (10 mg total) before bedtime. 180 tablet 3    benzoyl peroxide 5 % External Gel Apply 1 Application topically 2 (two) times daily. (Patient not taking: Reported on 9/5/2024) 60 g 3    ALPRAZolam (XANAX) 0.25 MG Oral Tab Take 1 tablet (0.25 mg total) by mouth daily as needed for Anxiety. 30 tablet 3    hydrOXYzine 25 MG Oral Tab Take 1 tablet (25 mg total) by mouth nightly as needed for Anxiety. 90 tablet 3    Multiple Vitamin (MULTIVITAMIN ADULT OR) Take 1 tablet by mouth daily.       Allergies:Allergies[1]    Objective:   Physical Exam  Constitutional:       General: She is not in acute distress.  Neurological:      Mental Status: She is alert.         Assessment & Plan:   1. Sore throat     -Secondary to pharyngitis, viral versus bacterial  - May gargle with salt and water  - May take Tylenol or ibuprofen as needed for fever pain  - Keep hydrated  - Go to the emergency room if with respiratory distress and/or severe dehydration  - Will go ahead and treat with Z-Nenita which was sent to her pharmacy  - Call or come in sooner if symptoms worsen or persist    This note was prepared using Dragon Medical voice recognition dictation software. As a result errors may occur. When identified these errors have been corrected. While every attempt is made to correct errors during dictation discrepancies may still exist            No orders of the defined types were placed in this encounter.      Meds This Visit:  Requested Prescriptions     Signed Prescriptions Disp Refills    azithromycin (ZITHROMAX Z-NENITA) 250 MG Oral Tab 6 tablet 0     Sig: Take 2 tablets (500 mg total) by mouth daily for 1 day, THEN 1 tablet (250 mg total) daily for 4 days.       Imaging & Referrals:  None         [1] No Known Allergies

## 2025-02-10 ENCOUNTER — TELEPHONE (OUTPATIENT)
Dept: FAMILY MEDICINE CLINIC | Facility: CLINIC | Age: 35
End: 2025-02-10

## 2025-02-10 NOTE — TELEPHONE ENCOUNTER
Erica Trivedi masked at check-in for Urgent Care visit on 01/30/20.   Spoke to patient.   Was diagnosed with pneumonia on 2/3/2025.   Was given antibiotics, steroids, inhaler and nebulizer medication.  Patient continues to have cough, chest congestion, tiredness and fever.   Patient states fever has been present since Friday. Tmax 103.6 on Saturday. Has been alternating q4 tylenol and ibuprofen. Today fever is at 101.something.  Patient requesting a follow up appointment.    Schedule is full this week.   Did you want to double book her some time this week?

## 2025-02-10 NOTE — TELEPHONE ENCOUNTER
Patient went to  and has pneumonia, has had a fever since Friday, today is 100.1 would like an appointment this week with Dr. Biswas, hoping she could squeeze in a sick visit.

## 2025-02-10 NOTE — TELEPHONE ENCOUNTER
Future Appointments   Date Time Provider Department Center   2/11/2025  9:00 AM Billy Biswas MD [facility] [address] [facility] [address]     Patient scheduled appointment

## 2025-02-10 NOTE — TELEPHONE ENCOUNTER
Yes, please offer double-booked appointment. Any time today other than 2-5 would be okay. It would also be okay to double-book tomorrow any time other than 9:30, 11, 11:30, or 1:30

## 2025-02-11 ENCOUNTER — HOSPITAL ENCOUNTER (OUTPATIENT)
Dept: CT IMAGING | Age: 35
End: 2025-02-11
Attending: STUDENT IN AN ORGANIZED HEALTH CARE EDUCATION/TRAINING PROGRAM
Payer: COMMERCIAL

## 2025-02-11 ENCOUNTER — TELEPHONE (OUTPATIENT)
Dept: FAMILY MEDICINE CLINIC | Facility: CLINIC | Age: 35
End: 2025-02-11

## 2025-02-11 ENCOUNTER — OFFICE VISIT (OUTPATIENT)
Dept: FAMILY MEDICINE CLINIC | Facility: CLINIC | Age: 35
End: 2025-02-11
Payer: COMMERCIAL

## 2025-02-11 ENCOUNTER — HOSPITAL ENCOUNTER (OUTPATIENT)
Dept: GENERAL RADIOLOGY | Age: 35
Discharge: HOME | End: 2025-02-11
Attending: STUDENT IN AN ORGANIZED HEALTH CARE EDUCATION/TRAINING PROGRAM
Payer: COMMERCIAL

## 2025-02-11 ENCOUNTER — HOSPITAL ENCOUNTER (OUTPATIENT)
Dept: CT IMAGING | Age: 35
Discharge: HOME | End: 2025-02-11
Attending: STUDENT IN AN ORGANIZED HEALTH CARE EDUCATION/TRAINING PROGRAM
Payer: COMMERCIAL

## 2025-02-11 ENCOUNTER — MOBILE ENCOUNTER (OUTPATIENT)
Dept: FAMILY MEDICINE CLINIC | Facility: CLINIC | Age: 35
End: 2025-02-11

## 2025-02-11 VITALS
OXYGEN SATURATION: 99 % | HEART RATE: 100 BPM | RESPIRATION RATE: 18 BRPM | SYSTOLIC BLOOD PRESSURE: 110 MMHG | DIASTOLIC BLOOD PRESSURE: 70 MMHG | WEIGHT: 143.13 LBS | HEIGHT: 62 IN | BODY MASS INDEX: 26.34 KG/M2 | TEMPERATURE: 100 F

## 2025-02-11 DIAGNOSIS — R06.02 SOB (SHORTNESS OF BREATH): ICD-10-CM

## 2025-02-11 DIAGNOSIS — R13.10 DYSPHAGIA, UNSPECIFIED TYPE: Primary | ICD-10-CM

## 2025-02-11 DIAGNOSIS — R50.9 FEVER, UNSPECIFIED FEVER CAUSE: ICD-10-CM

## 2025-02-11 DIAGNOSIS — R07.9 CHEST PAIN, UNSPECIFIED TYPE: ICD-10-CM

## 2025-02-11 DIAGNOSIS — E86.0 DEHYDRATION: ICD-10-CM

## 2025-02-11 DIAGNOSIS — R13.10 DYSPHAGIA, UNSPECIFIED TYPE: ICD-10-CM

## 2025-02-11 DIAGNOSIS — M94.0 COSTOCHONDRITIS: ICD-10-CM

## 2025-02-11 DIAGNOSIS — Z86.711 HISTORY OF PULMONARY EMBOLUS (PE): ICD-10-CM

## 2025-02-11 DIAGNOSIS — J02.9 SORE THROAT: ICD-10-CM

## 2025-02-11 LAB
CONTROL LINE PRESENT WITH A CLEAR BACKGROUND (YES/NO): YES YES/NO
KIT LOT #: NORMAL NUMERIC
STREP GRP A CUL-SCR: NEGATIVE

## 2025-02-11 PROCEDURE — 71046 X-RAY EXAM CHEST 2 VIEWS: CPT | Performed by: STUDENT IN AN ORGANIZED HEALTH CARE EDUCATION/TRAINING PROGRAM

## 2025-02-11 PROCEDURE — 70491 CT SOFT TISSUE NECK W/DYE: CPT | Performed by: STUDENT IN AN ORGANIZED HEALTH CARE EDUCATION/TRAINING PROGRAM

## 2025-02-11 PROCEDURE — 71275 CT ANGIOGRAPHY CHEST: CPT | Performed by: STUDENT IN AN ORGANIZED HEALTH CARE EDUCATION/TRAINING PROGRAM

## 2025-02-11 PROCEDURE — 3074F SYST BP LT 130 MM HG: CPT | Performed by: STUDENT IN AN ORGANIZED HEALTH CARE EDUCATION/TRAINING PROGRAM

## 2025-02-11 PROCEDURE — 3008F BODY MASS INDEX DOCD: CPT | Performed by: STUDENT IN AN ORGANIZED HEALTH CARE EDUCATION/TRAINING PROGRAM

## 2025-02-11 PROCEDURE — 87880 STREP A ASSAY W/OPTIC: CPT | Performed by: STUDENT IN AN ORGANIZED HEALTH CARE EDUCATION/TRAINING PROGRAM

## 2025-02-11 PROCEDURE — 99214 OFFICE O/P EST MOD 30 MIN: CPT | Performed by: STUDENT IN AN ORGANIZED HEALTH CARE EDUCATION/TRAINING PROGRAM

## 2025-02-11 PROCEDURE — 3078F DIAST BP <80 MM HG: CPT | Performed by: STUDENT IN AN ORGANIZED HEALTH CARE EDUCATION/TRAINING PROGRAM

## 2025-02-11 RX ORDER — AZITHROMYCIN 250 MG/1
TABLET, FILM COATED ORAL
COMMUNITY
Start: 2025-02-03

## 2025-02-11 RX ORDER — BENZONATATE 100 MG/1
100 CAPSULE ORAL 3 TIMES DAILY PRN
Qty: 30 CAPSULE | Refills: 0 | Status: SHIPPED | OUTPATIENT
Start: 2025-02-11 | End: 2025-02-21

## 2025-02-11 RX ORDER — ALBUTEROL SULFATE 90 UG/1
INHALANT RESPIRATORY (INHALATION)
COMMUNITY
Start: 2025-02-03

## 2025-02-11 RX ORDER — NAPROXEN 500 MG/1
500 TABLET ORAL 2 TIMES DAILY PRN
Qty: 14 TABLET | Refills: 0 | Status: SHIPPED | OUTPATIENT
Start: 2025-02-11 | End: 2025-02-18

## 2025-02-11 RX ORDER — PREDNISONE 20 MG/1
40 TABLET ORAL DAILY
COMMUNITY
Start: 2025-02-03

## 2025-02-11 NOTE — PROGRESS NOTES
Subjective:      Chief Complaint   Patient presents with    Pneumonia     Had fever since last Friday - has been continuously been taking medication - hard to breath - hard to swallow - states on Friday she was coughing up blood - does have chest pain - states also gets dizzy     HISTORY OF PRESENT ILLNESS  HPI  HPI obtained per patient report.  Giuliana Oconnor is a pleasant 34 year old female presenting with acute symptoms.   She was diagnosed with PNA on 2/3/25 at urgent care and prescribed azithromycin, prednisone, and albuterol nebulizer treatments. She states that she was tested for strep pharyngitis, Covid-19, influenza, and RSV at that time and was negative.   On 2/7, her symptoms began to worsen. Currently she reports fever, chills, cough productive of blood-tinged sputum, sore throat, dysphagia, SOB, CP, and dizziness. She has had limited oral tolerance of fluids/food due to dysphagia. Her chest pain is intermittent, brought on by deep inhalation and coughing, and described as sharp or tightness.     PAST PATIENT HISTORY  Past Medical History:    Anxiety    Pulmonary embolism (HCC)     Past Surgical History:   Procedure Laterality Date    Appendectomy      Eye surgery      cataract    Removal gallbladder         CURRENT MEDICATIONS  Medications Taking[1]    HEALTH MAINTENANCE  Immunization History   Administered Date(s) Administered    >=3 YRS TRI  MULTIDOSE VIAL (47535) FLU CLINIC 10/24/2008    Covid-19 Vaccine Pfizer 30 mcg/0.3 ml 09/13/2021, 11/03/2021    DTAP 07/04/2013    Influenza 10/27/2015, 10/14/2016    TDAP 10/27/2015, 10/25/2016    Td, Preserv Free 03/28/2007       ALLERGIES AND DRUG REACTIONS  Allergies[2]    Family History   Problem Relation Age of Onset    Other (Melanoma) Mother     Other (Melanoma) Maternal Grandmother     Asthma Maternal Grandmother     Diabetes Maternal Grandmother     Cancer Maternal Grandmother         Lung cancer    Breast Cancer Paternal Grandmother       Social History     Socioeconomic History    Marital status:    Tobacco Use    Smoking status: Never    Smokeless tobacco: Never   Vaping Use    Vaping status: Never Used   Substance and Sexual Activity    Alcohol use: Not Currently     Comment: once every 3 months    Drug use: Never     Social Drivers of Health     Food Insecurity: Not on File (5/30/2023)    Received from [facility]    Food Insecurity     Food: 0   Transportation Needs: Not on File (5/30/2023)    Received from [facility]    Transportation Needs     Transportation: 0   Stress: Not on File (10/6/2022)    Received from [facility]    Stress     Stress: 0    [facility]       Review of Systems   Constitutional:  Positive for chills and fever.   HENT:  Positive for sore throat.    Respiratory:  Positive for cough and shortness of breath.    Cardiovascular:  Positive for chest pain.   Neurological:  Positive for dizziness.   All other systems reviewed and are negative.         Objective:      /70   Pulse 100   Temp 100.2 °F (37.9 °C) (Oral)   Resp 18   Ht 5' 2\" (1.575 m)   Wt 143 lb 2 oz (64.9 kg)   LMP 01/11/2025 (Approximate)   SpO2 99%   BMI 26.18 kg/m²   Body mass index is 26.18 kg/m².    Physical Exam  Vitals reviewed.   Constitutional:       General: She is not in acute distress.     Appearance: She is not ill-appearing, toxic-appearing or diaphoretic.   HENT:      Head: Normocephalic and atraumatic.      Nose: Nose normal.      Mouth/Throat:      Mouth: Mucous membranes are moist.      Pharynx: Oropharynx is clear. Posterior oropharyngeal erythema (mild pharyngeal erythema) present. No oropharyngeal exudate.   Eyes:      General: No scleral icterus.        Right eye: No discharge.         Left eye: No discharge.      Extraocular Movements: Extraocular movements intact.      Conjunctiva/sclera: Conjunctivae normal.   Cardiovascular:      Rate and Rhythm: Normal rate and  regular rhythm.      Heart sounds: Normal heart sounds.   Pulmonary:      Effort: Pulmonary effort is normal.      Breath sounds: Normal breath sounds.   Chest:      Chest wall: Tenderness (tenderness along sternocostal joints) present.   Abdominal:      General: Abdomen is flat. There is no distension.      Tenderness: There is no abdominal tenderness.   Musculoskeletal:      Cervical back: Neck supple. Tenderness present.      Right lower leg: No edema.      Left lower leg: No edema.   Lymphadenopathy:      Cervical: Cervical adenopathy (tender B/L submandibular and anterior cervical LAD) present.   Skin:     General: Skin is warm and dry.      Coloration: Skin is not jaundiced or pale.      Findings: No bruising, erythema or rash.   Neurological:      Mental Status: She is alert and oriented to person, place, and time.            Assessment and Plan:      1. Dysphagia, unspecified type (Primary)  -     CT SOFT TISSUE OF NECK (CPT=70490); Future; Expected date: 02/11/2025  2. Dehydration  3. Sore throat  -     Strep A Assay W/Optic  4. SOB (shortness of breath)  -     XR CHEST PA + LAT CHEST (CPT=71046); Future; Expected date: 02/11/2025  -     CT SOFT TISSUE OF NECK (CPT=70490); Future; Expected date: 02/11/2025  -     CTA CHEST (CPT=71275); Future; Expected date: 02/11/2025  5. Chest pain, unspecified type  -     CTA CHEST (CPT=71275); Future; Expected date: 02/11/2025  6. Costochondritis  7. Fever, unspecified fever cause  -     CTA CHEST (CPT=71275); Future; Expected date: 02/11/2025  8. History of pulmonary embolus (PE)  -     CTA CHEST (CPT=71275); Future; Expected date: 02/11/2025    Return if symptoms worsen or fail to improve.    - rapid strep negative  - recommended STAT CXR and CT neck for further evaluation including for possible worsening PNA and to R/O neck abscess  - her CXR result was normal. Due to her history of PE recommended STAT CTA chest for further evaluation      Patient verbalized  understanding of assessment and recommendations. All questions and concerns were addressed.    Electronically signed by Billy Biswas MD         [1]   Outpatient Medications Marked as Taking for the 2/11/25 encounter (Office Visit) with Billy Biswsa MD   Medication Sig Dispense Refill    azithromycin 250 MG Oral Tab       albuterol 108 (90 Base) MCG/ACT Inhalation Aero Soln INHALE 1 PUFF BY MOUTH EVERY 4 TO 6 HOURS FOR 10 DAYS AS NEEDED FOR SHORTNESS OF BREATH OR WHEEZING      predniSONE 20 MG Oral Tab Take 2 tablets (40 mg total) by mouth daily.      ALPRAZolam (XANAX) 0.25 MG Oral Tab Take 1 tablet (0.25 mg total) by mouth daily as needed for Anxiety. 30 tablet 3    hydrOXYzine 25 MG Oral Tab Take 1 tablet (25 mg total) by mouth nightly as needed for Anxiety. 90 tablet 3    Multiple Vitamin (MULTIVITAMIN ADULT OR) Take 1 tablet by mouth daily.     [2] No Known Allergies

## 2025-02-11 NOTE — TELEPHONE ENCOUNTER
Received call from Janna in radiology regarding STAT results below:      PROCEDURE:  XR CHEST PA + LAT CHEST     CONCLUSION:  No acute cardiopulmonary process.

## 2025-02-11 NOTE — TELEPHONE ENCOUNTER
Order placed for CT soft tissue of neck with contrast.   Attempted to contact radiology- no answer.

## 2025-02-11 NOTE — PROGRESS NOTES
Duglas Giordano,     Your chest x-ray was unexpectedly normal. Because of your chest pain and shortness of breath symptoms, I would recommend a CTA of your chest for further evaluation. Please schedule this test through the same process as for your neck CT, and let me know if you are unable to complete this scan today.     Dr. Biswas

## 2025-02-11 NOTE — TELEPHONE ENCOUNTER
Future Appointments   Date Time Provider Department Center   2/11/2025  5:00 PM PF CT RM1 PF [facility]   2/11/2025  5:30 PM PF CT RM1 PF CT [facility]     Radiology calling, patient having CT soft tissue of neck. Recommends test with contrast, patient having test today.

## 2025-02-12 NOTE — PROGRESS NOTES
I spoke with the pt by phone regarding her stat CT results. CT neck showed signs of tonsillitis. Recommended taking augmentin as prescribed. We discussed that her symptoms are expected to start improving with the first 2-3 days of the antibiotic course. PRN naproxen and tessalon perles were  also sent for her. If her symptoms do not improve as noted above, if symptoms worsen, or if new symptoms develop, recommended seeking further care in the ER

## 2025-02-16 ENCOUNTER — PATIENT MESSAGE (OUTPATIENT)
Dept: FAMILY MEDICINE CLINIC | Facility: CLINIC | Age: 35
End: 2025-02-16

## 2025-02-18 NOTE — TELEPHONE ENCOUNTER
I would recommend evaluation in the ER, as she likely needs stat imaging and possible IV medications

## 2025-02-25 ENCOUNTER — TELEPHONE (OUTPATIENT)
Dept: OTOLARYNGOLOGY | Facility: CLINIC | Age: 35
End: 2025-02-25

## 2025-03-31 ENCOUNTER — TELEPHONE (OUTPATIENT)
Dept: FAMILY MEDICINE CLINIC | Facility: CLINIC | Age: 35
End: 2025-03-31

## 2025-03-31 NOTE — TELEPHONE ENCOUNTER
Spoke to patient  States she had vomiting / diarrhea this past Thursday 3/27/25.  Went to  who said it was norovirus.   Patient said symptoms continued all weekend and yesterday she developed terrible pain in lower abdomen/lower back after bending over.   Went to ER ([facility]) and was told it was a UTI. Prescribed cephalexin.     Patient states symptoms are worse today. Can barely walk due to the pain.   Has pain in bilateral lower/mid back. Urine is dark. Barely urinating. Has n/v. No longer has diarrhea. Difficulty drinking water since she is afraid she will vomit.    Patient is sweating and has chills. Does not know if she has fever since she does not have thermometer.     Advised patient to go to ER to be evaluated again due to worsening symptoms/pain. Needs to push fluids due to dehydration. Patient verbalized understanding.

## 2025-03-31 NOTE — TELEPHONE ENCOUNTER
Appointment for: Giuliana Oconnor ([id number])   Visit type: MYCHART EXAM (2964)   4/11/2025 12:00 PM (30 minutes) with Billy Biswas in [facility] [address]      Patient comments:   UTI     OK to wait?

## 2025-04-03 ENCOUNTER — TELEMEDICINE (OUTPATIENT)
Dept: FAMILY MEDICINE CLINIC | Facility: CLINIC | Age: 35
End: 2025-04-03
Payer: COMMERCIAL

## 2025-04-03 ENCOUNTER — TELEPHONE (OUTPATIENT)
Dept: FAMILY MEDICINE CLINIC | Facility: CLINIC | Age: 35
End: 2025-04-03

## 2025-04-03 DIAGNOSIS — K76.9 LIVER LESION: ICD-10-CM

## 2025-04-03 DIAGNOSIS — N30.00 ACUTE CYSTITIS WITHOUT HEMATURIA: ICD-10-CM

## 2025-04-03 DIAGNOSIS — M54.50 ACUTE BILATERAL LOW BACK PAIN, UNSPECIFIED WHETHER SCIATICA PRESENT: Primary | ICD-10-CM

## 2025-04-03 PROCEDURE — 98006 SYNCH AUDIO-VIDEO EST MOD 30: CPT | Performed by: FAMILY MEDICINE

## 2025-04-03 RX ORDER — NITROFURANTOIN 25; 75 MG/1; MG/1
100 CAPSULE ORAL 2 TIMES DAILY
Qty: 14 CAPSULE | Refills: 0 | Status: SHIPPED | OUTPATIENT
Start: 2025-04-03 | End: 2025-04-10

## 2025-04-03 NOTE — PROGRESS NOTES
Subjective:   Patient ID: Giuliana Oconnor is a 34 year old female.    HPI  Ms. Oconnor is a pleasant 34-year-old female with history of anxiety presenting today for low back pain.  She is doing a video visit today.  She was seen at the emergency room on 3/30/2025 for back pain associated with urinary frequency, incomplete bladder emptying.  Ultrasound of the pelvis was done which showed ovarian follicle.  CT scan of the abdomen and pelvis was done which showed no acute findings.  There was a new hepatic lesion noted on the right lobe.  Urine culture showed multiple angel.  She was sent home on cephalexin.  However she continues to have symptoms.  No fever no chills no nausea no vomiting.    I had reviewed past medical and family histories together with allergy and medication lists documented.    History/Other:   Review of Systems   Constitutional:  Negative for fatigue and fever.   Respiratory:  Negative for cough and shortness of breath.    Cardiovascular:  Negative for chest pain.   Gastrointestinal:  Negative for diarrhea, nausea and vomiting.     Current Outpatient Medications   Medication Sig Dispense Refill    nitrofurantoin monohydrate macro 100 MG Oral Cap Take 1 capsule (100 mg total) by mouth 2 (two) times daily for 7 days. 14 capsule 0    azithromycin 250 MG Oral Tab       albuterol 108 (90 Base) MCG/ACT Inhalation Aero Soln INHALE 1 PUFF BY MOUTH EVERY 4 TO 6 HOURS FOR 10 DAYS AS NEEDED FOR SHORTNESS OF BREATH OR WHEEZING      predniSONE 20 MG Oral Tab Take 2 tablets (40 mg total) by mouth daily.      acetaminophen-codeine 300-60 MG Oral Tab Take 1 tablet by mouth every 6 (six) hours as needed for Pain. (Patient not taking: Reported on 2/11/2025) 20 tablet 0    busPIRone 10 MG Oral Tab Take 1 tablet (10 mg total) by mouth in the morning and 1 tablet (10 mg total) before bedtime. (Patient not taking: Reported on 2/11/2025) 180 tablet 3    benzoyl peroxide 5 % External Gel Apply 1 Application  topically 2 (two) times daily. (Patient not taking: Reported on 2/11/2025) 60 g 3    ALPRAZolam (XANAX) 0.25 MG Oral Tab Take 1 tablet (0.25 mg total) by mouth daily as needed for Anxiety. 30 tablet 3    hydrOXYzine 25 MG Oral Tab Take 1 tablet (25 mg total) by mouth nightly as needed for Anxiety. 90 tablet 3    Multiple Vitamin (MULTIVITAMIN ADULT OR) Take 1 tablet by mouth daily.       Allergies:Allergies[1]    Objective:   Physical Exam  Constitutional:       General: She is not in acute distress.  Neurological:      Mental Status: She is alert.   Psychiatric:         Mood and Affect: Mood normal.         Behavior: Behavior normal.         Assessment & Plan:   1. Acute bilateral low back pain, unspecified whether sciatica present    2. Acute cystitis without hematuria    3. Liver lesion    -Keep hydrated  - Will change cephalexin to Macrobid 1 tablet twice a day for 7 days  - May take Tylenol as needed for fever pain  - Will repeat urinalysis and urine culture  - With regards to new liver lesion we will proceed with ordering abdominal ultrasound  - Call or come us if symptoms worsen or persist      This note was prepared using Dragon [employer] voice recognition dictation software. As a result errors may occur. When identified these errors have been corrected. While every attempt is made to correct errors during dictation discrepancies may still exist            Orders Placed This Encounter   Procedures    Urinalysis, Routine [E]    Urine Culture, Routine [E]       Meds This Visit:  Requested Prescriptions     Signed Prescriptions Disp Refills    nitrofurantoin monohydrate macro 100 MG Oral Cap 14 capsule 0     Sig: Take 1 capsule (100 mg total) by mouth 2 (two) times daily for 7 days.       Imaging & Referrals:  US ABDOMEN COMPLETE (CPT=76700)         [1] No Known Allergies

## 2025-04-04 ENCOUNTER — TELEPHONE (OUTPATIENT)
Dept: FAMILY MEDICINE CLINIC | Facility: CLINIC | Age: 35
End: 2025-04-04

## 2025-04-04 NOTE — TELEPHONE ENCOUNTER
Patient called and said she spoke with Dr. Russ yesterday over a video and he ordered and ultrasound and a CT of the neck.  She has no idea why he ordered the CT of the neck.  The tech at [place] said it doesn't make sense.  She wants to know if he still wants her to have it for some reason?

## 2025-04-04 NOTE — TELEPHONE ENCOUNTER
Spoke to patient.   States she already knew neck CT order is an error/duplicate.   She was calling to ask if Dr. Russ was going to order abdominal CT for kidney pain along with ultrasound of abdomen for liver lesion.   Advised patient message would be sent to PCP for clarification.   Patient states she will already be in ER by then due to pain.   Patient states she can barely walk due to pain in back/kidney area.     Advised patient to go to ER now due to pain.   Patient verbalized understanding and said she will go.

## 2025-04-04 NOTE — TELEPHONE ENCOUNTER
Spoke with patient initially at 6:10 pm. Patient was at radiology. Had a telehealth visit today and was told an order for a CT of the abdomen had been placed. When she arrived, no CT order was on file. There was an order for an abdominal ultrasound. I told the patient I was on the [place] and did not have access to a computer. I forwarded the message to her PCP for clarification.  At 7 pm, I called the patient back and let her know I was unable to make contact with her PCP. Upon review of the chart, the patient had been seen by Dr. Russ which she did not tell me during our first phone call. I told her I reviewed today's note and only see the order for the abdominal ultrasound. She stated her back pain was getting worse and she didn't not what to do. I told if the pain is not tolerable, she should be seen in the ER. Otherwise, she should call the office in the morning and confirm her radiology orders. Patient voiced understanding of these instructions.

## 2025-04-11 ENCOUNTER — OFFICE VISIT (OUTPATIENT)
Facility: CLINIC | Age: 35
End: 2025-04-11
Payer: COMMERCIAL

## 2025-04-11 ENCOUNTER — LAB ENCOUNTER (OUTPATIENT)
Dept: LAB | Age: 35
End: 2025-04-11
Payer: COMMERCIAL

## 2025-04-11 VITALS
WEIGHT: 148 LBS | HEIGHT: 62 IN | DIASTOLIC BLOOD PRESSURE: 68 MMHG | SYSTOLIC BLOOD PRESSURE: 102 MMHG | BODY MASS INDEX: 27.23 KG/M2

## 2025-04-11 DIAGNOSIS — N64.4 BREAST PAIN, LEFT: ICD-10-CM

## 2025-04-11 DIAGNOSIS — N94.10 DYSPAREUNIA IN FEMALE: ICD-10-CM

## 2025-04-11 DIAGNOSIS — Z86.19 HISTORY OF INFECTION DUE TO HUMAN PAPILLOMA VIRUS (HPV): ICD-10-CM

## 2025-04-11 DIAGNOSIS — Z01.419 WELL WOMAN EXAM WITH ROUTINE GYNECOLOGICAL EXAM: Primary | ICD-10-CM

## 2025-04-11 DIAGNOSIS — Z12.4 SCREENING FOR CERVICAL CANCER: ICD-10-CM

## 2025-04-11 DIAGNOSIS — N94.6 DYSMENORRHEA: ICD-10-CM

## 2025-04-11 DIAGNOSIS — Z11.3 SCREEN FOR STD (SEXUALLY TRANSMITTED DISEASE): ICD-10-CM

## 2025-04-11 DIAGNOSIS — N64.52 DISCHARGE FROM LEFT NIPPLE: ICD-10-CM

## 2025-04-11 DIAGNOSIS — N88.8 NABOTHIAN FOLLICLES ON CERVIX: ICD-10-CM

## 2025-04-11 DIAGNOSIS — N93.0 POSTCOITAL BLEEDING: ICD-10-CM

## 2025-04-11 DIAGNOSIS — Z71.1 WORRIED WELL: ICD-10-CM

## 2025-04-11 DIAGNOSIS — Z80.41 FAMILY HISTORY OF OVARIAN CANCER: ICD-10-CM

## 2025-04-11 LAB
HBV SURFACE AG SER-ACNC: <0.1 [IU]/L
HBV SURFACE AG SERPL QL IA: NONREACTIVE
HCV AB SERPL QL IA: NONREACTIVE
T PALLIDUM AB SER QL IA: NONREACTIVE

## 2025-04-11 PROCEDURE — 86780 TREPONEMA PALLIDUM: CPT

## 2025-04-11 PROCEDURE — 87625 HPV TYPES 16 & 18 ONLY: CPT

## 2025-04-11 PROCEDURE — 87389 HIV-1 AG W/HIV-1&-2 AB AG IA: CPT

## 2025-04-11 PROCEDURE — 87624 HPV HI-RISK TYP POOLED RSLT: CPT

## 2025-04-11 PROCEDURE — 87591 N.GONORRHOEAE DNA AMP PROB: CPT

## 2025-04-11 PROCEDURE — 3074F SYST BP LT 130 MM HG: CPT

## 2025-04-11 PROCEDURE — 86803 HEPATITIS C AB TEST: CPT

## 2025-04-11 PROCEDURE — 88175 CYTOPATH C/V AUTO FLUID REDO: CPT

## 2025-04-11 PROCEDURE — 87340 HEPATITIS B SURFACE AG IA: CPT

## 2025-04-11 PROCEDURE — 3008F BODY MASS INDEX DOCD: CPT

## 2025-04-11 PROCEDURE — 87491 CHLMYD TRACH DNA AMP PROBE: CPT

## 2025-04-11 PROCEDURE — 36415 COLL VENOUS BLD VENIPUNCTURE: CPT

## 2025-04-11 PROCEDURE — 3078F DIAST BP <80 MM HG: CPT

## 2025-04-11 PROCEDURE — 99385 PREV VISIT NEW AGE 18-39: CPT

## 2025-04-11 NOTE — PROGRESS NOTES
GYN H&P     Genetic questionnaire reviewed with the patient and she will be referred for genetic counseling if the questionnaire had any positive results.    The [facility] intake form was also reviewed regarding contraception, menstrual periods, urinary health, and vaginal / sexual health    2025  2:55 PM    Chief Complaint   Patient presents with    Physical     Pt states she had pap through ([facility] ?) which showed positive hpv  mother and maternal grandma both have ovarian cancer       HPI: Giuliana is a 34 year old  Patient's last menstrual period was 2025 (approximate).  (contraception: none) here for her annual gyn exam. Requesting STD testing today.    Menses are regular. Denies any pelvic or breast complaints.  Satisfied with current contraception. Has the following concerns:    Dyspareunia and spotting after intercourse - states that over the past 6 months or so, she has been experiencing pain with intercourse and occasional spotting after sex. Reports the pain comes/goes and sharp/shooting in nature, radiating across her abdomen. Went to [facility] ER on 3/30/25 for lower abd pain and a CT scan was done which showed a lesion on her liver. She already has an appointment schedule to follow up on that.  Her pelvic imaging was unremarkable with no fibroids or ovarian cysts, just noting a \"prominent cervix.\" She was treated for a UTI at that time and reports her symptoms are improved    Dysmenorrhea - states her periods are very painful, interfering with her ability to work. States \"sometimes menses are heavy and sometimes they are light.\" Feels random cramping even when not on her period. Denies GI/ concerns.    Hx abnormal pap smear - hx of abnormal pap smear through [facility] around  per patient? Unable to view records. States she remembers she was told she tested positive for HrHPV but never followed up. Worried she has cervical cancer.    Family hx of  ovarian cancer - mother and maternal grandmother had hx of ovarian cancer around age 50? Has not had any genetic counseling done but interested. Worried she has ovarian cancer too - admits to being a hypochondriac at times    Left breast pain - reports outer left breast pain for the past few years. States it is tender to the touch at time and \"sometimes hot.\" Reports associated clear leakage from the left nipple at times as well. States she sometimes notices the nipple discharge on her bra at the end of the day. The nipple discharge \"comes and goes\" and may go a few weeks without noticing anything. Denies breast erythema, dimpling, or skin changes. Reports family hx of breast cancer.    Previous encounters and chart reviewed.     OB History    Para Term  AB Living   2 2 2   2   SAB IAB Ectopic Multiple Live Births       2      # Outcome Date GA Lbr Colby/2nd Weight Sex Type Anes PTL Lv   2 Term 16 42w0d    NORMAL SPONT   BETZAIDA   1 Term 16 42w0d    NORMAL SPONT   BETZAIDA       GYN hx:   Menarche: 16  Period Cycle (Days): 30  Period Duration (Days): 2-7  Use of Birth Control (if yes, specify type): None  Hx Prior Abnormal Pap: Yes      Past Medical History[1]  Past Surgical History[2]  Allergies[3]  Medications Ordered Prior to Encounter[4]  Family History[5]  Social History     Socioeconomic History    Marital status:      Spouse name: Not on file    Number of children: Not on file    Years of education: Not on file    Highest education level: Not on file   Occupational History    Not on file   Tobacco Use    Smoking status: Never    Smokeless tobacco: Never   Vaping Use    Vaping status: Never Used   Substance and Sexual Activity    Alcohol use: Not Currently     Comment: once every 3 months    Drug use: Never    Sexual activity: Not on file   Other Topics Concern    Not on file   Social History Narrative    Not on file     Social Drivers of Health     Food Insecurity: No Food Insecurity  (4/11/2025)    NCSS - Food Insecurity     Worried About Running Out of Food in the Last Year: No     Ran Out of Food in the Last Year: No   Transportation Needs: No Transportation Needs (4/11/2025)    NCSS - Transportation     Lack of Transportation: No   Stress: Not on File (10/6/2022)    Received from [facility]    Stress     Stress: 0   Housing Stability: Not At Risk (4/11/2025)    NCSS - Housing/Utilities     Has Housing: Yes     Worried About Losing Housing: No     Unable to Get Utilities: No       ROS:     Review of Systems:  General: denies fevers, chills, fatigue and malaise.   Eyes: no visual changes, denies headaches  ENT: no complaints, denies earaches, runny nose, epistaxis, throat pain or sore throat  Respiratory: denies SOB, dyspnea, cough or wheezing  Cardiovascular: denies chest pain, palpitations, exercise intolerance   GI: denies abdominal pain, diarrhea, constipation  : no complaints, denies dysuria, increased urinary frequency. Menses regular, +dysmenorrhea, + menorrhagia, +dyspareunia   Hematological/lymphatic: denies history of excessive bleeding or bruising, denies dizziness, lightheadedness.   Breast: denies rashes, skin changes, pain, lumps or discharge   Psychiatric: denies depression, changes in sleep patterns, anxiety  Endocrine: denies hot or cold intolerance, mood changes   Neurological: denies changes in sight, smell, hearing or taste. Denies seizures or tremors  Immunological: denies allergies, denies anaphylaxis, or swollen lymph nodes  Musculoskeletal: denies joint pain, morning stiffness, decreased range of motion         O /68   Ht 62\"   Wt 148 lb (67.1 kg)   LMP 04/03/2025 (Approximate)   BMI 27.07 kg/m²         Wt Readings from Last 6 Encounters:   04/11/25 148 lb (67.1 kg)   02/11/25 143 lb 2 oz (64.9 kg)   09/05/24 137 lb (62.1 kg)   03/04/24 140 lb (63.5 kg)   02/17/24 134 lb 6 oz (61 kg)   12/30/21 130 lb (59 kg)     Exam:   GENERAL: anxious, well developed, well  nourished, in no apparent distress, oriented.  SKIN: no rashes, no suspicious lesions  HEENT: normal  NECK: supple; no thyromegaly, no adenopathy  LUNGS: clear to auscultation  CARDIOVASCULAR: normal S1, S2, RRR  BREASTS: +tenderness to outer aspect of left breast, no palpable masses or lumps - soft, no palpable masses or nodes, no nipple discharge, no skin changes, no axillary adenopathy  ABDOMEN: no scars,  soft, non distended; non tender, no masses  PELVIC: External Genitalia: Normal appearing, no lesions.    Vagina: normal pink mucosa, no lesions, normal clear discharge.    Bladder well supported.  No  anterior or posterior hernias    Cervix: multiparous, no lesions , No CMT, Nabothian cyst at 1-3 o'clock    Uterus: AVAF, mobile, non tender, normal size    Adnexa: non tender, no masses, normal size    Rectal: deferred  EXTREMITIES:  non tender without edema             Patient counseled on:    Diet/exercise.      Self Breast Exams     Safe sex practices / and living environment     Vaccines:  Annual Flu          Pap: 2019/2020? +HrHPV per patient - collected today  GC/Chlamydia:  collected via pap  Mammogram:  diagnostic left mammogram ordered for breast pain and nipple discharge   Dexa:  n/a  Colonoscopy / Cologuard:   n/a, start at age 47  Lipid / Cholesterol:  2020 per PCP    Diagnostic Results (03/30/2025 21:25 CDT CT Abdomen Stone WO Pelvis Stone WO Cont) FINDINGS: No acute intra-abdominal process identified. No free fluid or free air. There is again evidence of appendectomy. There are now cholecystectomy clips. Evaluation of the viscera is again suboptimal without IV contrast material. There is however what appears to be new rounded small low-attenuation lesion within the dome of the right lobe of the liver measuring 1.4 cm diameter and which appears not to represent a simple cyst. Spleen, pancreas, adrenal glands and kidneys unremarkable. No urolithiasis or hydronephrosis. There is noted some prominence  of the cervix. IMPRESSION: 1. No evidence of an acute intra-abdominal process. Postop changes appendectomy and cholecystectomy. 2. Apparent new small low-attenuation lesion within the dome of the right lobe the liver. Consider further evaluation with contrast-enhanced CT, ultrasound or MRI. 3. Apparent prominence of cervix. Recommend clinical/gynecological correlation. [1] (03/31/2025 00:33 CDT     US Abdomen or Pelvis Duplex Limited) Findings: The uterus measures 9.3 x 4.3 x 4.1 cm. No focal uterine masses are seen. The endometrium measures 1.4 cm in diameter. The right ovary measures 4.5 x 2.8 x 3.8 cm . The left ovary measures 4.5 x 1.4 x 3.5 cm. No adnexal lesion is seen. Duplex imaging demonstrates arterial and venous flow to the ovaries. No free pelvic fluid is demonstrated. Dominant right ovarian follicle measures 1.9 cm. Impression: No evidence of ovarian torsion. No uterine mass. Normal endometrial stripe. Dominant right ovarian follicle. No free fluid.      A/P: Patient is 34 year old female with no complaints. Here for well woman exam.     Meds This Visit:    Requested Prescriptions      No prescriptions requested or ordered in this encounter       1. Family history of ovarian cancer  - Genetic Counselor Referral - Elizabeth    2. Well woman exam with routine gynecological exam    3. Screening for cervical cancer  - ThinPrep PAP Smear; Future  - Hpv High Risk , Thin Prep Collection; Future  - ThinPrep PAP Smear  - Hpv High Risk , Thin Prep Collection    4. History of infection due to human papilloma virus (HPV)    5. Screen for STD (sexually transmitted disease)  - Chlamydia/Gc Amplification; Future  - HIV Ag/Ab Combo; Future  - T Pallidum Screening Cascade; Future  - HCV Antibody; Future  - Hepatitis B Surface Antigen; Future  - Chlamydia/Gc Amplification    6. Dyspareunia in female    7. Postcoital bleeding    8. Breast pain, left  - YANI KHANH 2D+3D DIAGNOSTIC YANI LEFT (CPT=77065/08478); Future    9.  Worried well    10. Dysmenorrhea    11. Discharge from left nipple  - YANI KHANH 2D+3D DIAGNOSTIC YANI LEFT (CPT=77065/34957); Future    12. Nabothian follicles on cervix    Plan pelvic ultrasound to assess dysmenorrhea and dyspareunia. GC/CT collected to r/o infection as the cause of post coital bleeding    Diagnostic mammogram ordered for left breast pain and nipple discharge    Genetic counseling referral placed for family hx of breast and ovarian cancer - pt provided with information on Jeannie Hern[facility]dez    Return in about 3 weeks (around 5/2/2025) for ultrasound.    Letty Morris, APRN   4/11/2025  2:55 PM       This note was created by [employer] voice recognition. Errors in content may be related to improper recognition by the system; efforts to review and correct have been done but errors may still exist. Please contact me with any questions.    Note to patient and family   The 21st Century Cures Act makes medical notes available to patients in the interest of transparency.  However, please be advised that this is a medical document.  It is intended as [id number] communication.  It is written in medical language which may contain abbreviations or verbiage that are technical and unfamiliar.  It may appear blunt or direct.  Medical documents are intended to carry relevant information, facts as evident, and the clinical opinion of the practitioner.           [1]   Past Medical History:   Anxiety    Pulmonary embolism (HCC)   [2]   Past Surgical History:  Procedure Laterality Date    Appendectomy      Eye surgery      cataract    Removal gallbladder     [3] No Known Allergies  [4]   Current Outpatient Medications on File Prior to Visit   Medication Sig Dispense Refill    azithromycin 250 MG Oral Tab       albuterol 108 (90 Base) MCG/ACT Inhalation Aero Soln INHALE 1 PUFF BY MOUTH EVERY 4 TO 6 HOURS FOR 10 DAYS AS NEEDED FOR SHORTNESS OF BREATH OR WHEEZING      predniSONE 20 MG Oral Tab Take 2 tablets (40 mg total) by  mouth daily.      acetaminophen-codeine 300-60 MG Oral Tab Take 1 tablet by mouth every 6 (six) hours as needed for Pain. (Patient not taking: Reported on 2/11/2025) 20 tablet 0    busPIRone 10 MG Oral Tab Take 1 tablet (10 mg total) by mouth in the morning and 1 tablet (10 mg total) before bedtime. (Patient not taking: Reported on 2/11/2025) 180 tablet 3    benzoyl peroxide 5 % External Gel Apply 1 Application topically 2 (two) times daily. (Patient not taking: Reported on 2/11/2025) 60 g 3    ALPRAZolam (XANAX) 0.25 MG Oral Tab Take 1 tablet (0.25 mg total) by mouth daily as needed for Anxiety. 30 tablet 3    hydrOXYzine 25 MG Oral Tab Take 1 tablet (25 mg total) by mouth nightly as needed for Anxiety. 90 tablet 3    Multiple Vitamin (MULTIVITAMIN ADULT OR) Take 1 tablet by mouth daily.       No current facility-administered medications on file prior to visit.   [5]   Family History  Problem Relation Age of Onset    Other (Melanoma) Mother     Ovarian Cancer Mother     Other (Melanoma) Maternal Grandmother     Asthma Maternal Grandmother     Diabetes Maternal Grandmother     Cancer Maternal Grandmother         Lung cancer    Ovarian Cancer Maternal Grandmother     Breast Cancer Paternal Grandmother

## 2025-04-14 LAB
C TRACH DNA SPEC QL NAA+PROBE: NEGATIVE
HPV E6+E7 MRNA CVX QL NAA+PROBE: POSITIVE
N GONORRHOEA DNA SPEC QL NAA+PROBE: NEGATIVE

## 2025-04-15 ENCOUNTER — MED REC SCAN ONLY (OUTPATIENT)
Facility: CLINIC | Age: 35
End: 2025-04-15

## 2025-04-15 ENCOUNTER — PATIENT MESSAGE (OUTPATIENT)
Facility: CLINIC | Age: 35
End: 2025-04-15

## 2025-04-15 NOTE — TELEPHONE ENCOUNTER
Patient called to discuss pap smear results (NILM, +HRHPV). Discussed results with patient and need to wait for HPV genotype to determine further recommendations. All questions answered, await HPV genotype.   Is This A New Presentation, Or A Follow-Up?: Wart How Severe Are Your Warts?: moderate

## 2025-04-17 LAB
HPV16 DNA CVX QL PROBE+SIG AMP: POSITIVE
HPV18 DNA CVX QL PROBE+SIG AMP: NEGATIVE

## 2025-04-18 ENCOUNTER — TELEPHONE (OUTPATIENT)
Facility: CLINIC | Age: 35
End: 2025-04-18

## 2025-04-18 NOTE — TELEPHONE ENCOUNTER
Attempted to contact patient regarding pap smear results and need for colpo - voicemail left. MyChart message also left.

## 2025-04-22 ENCOUNTER — ULTRASOUND ENCOUNTER (OUTPATIENT)
Facility: CLINIC | Age: 35
End: 2025-04-22
Payer: COMMERCIAL

## 2025-04-22 DIAGNOSIS — N93.0 PCB (POST COITAL BLEEDING): Primary | ICD-10-CM

## 2025-04-22 DIAGNOSIS — N94.10 DYSPAREUNIA IN FEMALE: ICD-10-CM

## 2025-04-22 PROCEDURE — 76830 TRANSVAGINAL US NON-OB: CPT | Performed by: OBSTETRICS & GYNECOLOGY

## 2025-04-25 ENCOUNTER — TELEPHONE (OUTPATIENT)
Facility: CLINIC | Age: 35
End: 2025-04-25

## 2025-04-25 NOTE — TELEPHONE ENCOUNTER
Patient called to discuss ultrasound results. Discussed finding of endocervical polyp and recommendation for removal, all questions answered. Patient already scheduled for colposcopy on 4/30/25 - will discuss hysteroscopic removal of endocervical polyp with Dr. Gamez at that time.

## 2025-04-30 ENCOUNTER — OFFICE VISIT (OUTPATIENT)
Facility: CLINIC | Age: 35
End: 2025-04-30
Payer: COMMERCIAL

## 2025-04-30 VITALS
SYSTOLIC BLOOD PRESSURE: 116 MMHG | BODY MASS INDEX: 26.65 KG/M2 | DIASTOLIC BLOOD PRESSURE: 68 MMHG | HEIGHT: 62 IN | WEIGHT: 144.81 LBS

## 2025-04-30 DIAGNOSIS — R87.810 CERVICAL HIGH RISK HUMAN PAPILLOMAVIRUS (HPV) DNA TEST POSITIVE: ICD-10-CM

## 2025-04-30 DIAGNOSIS — Z32.00 ENCOUNTER FOR PREGNANCY TEST, RESULT UNKNOWN: ICD-10-CM

## 2025-04-30 DIAGNOSIS — R87.618 OTHER ABNORMAL CYTOLOGICAL FINDING OF SPECIMEN FROM CERVIX: Primary | ICD-10-CM

## 2025-04-30 LAB
CONTROL LINE PRESENT WITH A CLEAR BACKGROUND (YES/NO): YES YES/NO
KIT LOT #: NORMAL NUMERIC
PREGNANCY TEST, URINE: NEGATIVE

## 2025-04-30 PROCEDURE — 3074F SYST BP LT 130 MM HG: CPT | Performed by: OBSTETRICS & GYNECOLOGY

## 2025-04-30 PROCEDURE — 88305 TISSUE EXAM BY PATHOLOGIST: CPT | Performed by: OBSTETRICS & GYNECOLOGY

## 2025-04-30 PROCEDURE — 3008F BODY MASS INDEX DOCD: CPT | Performed by: OBSTETRICS & GYNECOLOGY

## 2025-04-30 PROCEDURE — 57454 BX/CURETT OF CERVIX W/SCOPE: CPT | Performed by: OBSTETRICS & GYNECOLOGY

## 2025-04-30 PROCEDURE — 3078F DIAST BP <80 MM HG: CPT | Performed by: OBSTETRICS & GYNECOLOGY

## 2025-04-30 PROCEDURE — 81025 URINE PREGNANCY TEST: CPT | Performed by: OBSTETRICS & GYNECOLOGY

## 2025-04-30 NOTE — PROCEDURES
Colpo w/Cx Biopsy and ECC    Pregnancy Results: negative from urine test     Consent signed.  Procedure discussed with patient in detail including indication, risk, benefits, alternatives and complications.    Pap History:  negative    Indication:  neg/HR HPV 16    Pre-Procedure:  Pre-Meds:  Ibuprofen    Cervix prepped with:  Acetic acid    Procedure:  Under satisfactory analgesia, the patient was put in the dorsal lithotomy position.  Breckenridge speculum was placed in the vagina.  The Cervix was cleaned with normal saline.  The DySiS Digitial Colposcopy was attached to the speculum.  The cervix was centered,  focused and the light intensity was adjusted per protocol.  The procedure button was started and a 2 cc volume of acetic acid was sprayed over the cervix.  Under colposcopic examination the transition zone was seen in entirety. Over the next 120 seconds the cervix was examined using green, blue, and contrast filters marking each area of suspicion.  Following this the digital map was displayed and suspicious areas were added or removed based on the computer image recommendations.  An endocervical canal sample was obtained and sent to pathology.          Tiny area of AWE only noticed after map - no need to bx.     Exam vulval vaginal area was then performed and there were no lesions visible or areas of biopsy necessary.    Patient tolerated procedure well.      Findings:  Acetowhite epithelium    Impression:  Viral changes only    Patient was counseled regarding pelvic rest for 0 days or until the spotting resolved.    Follow up in sept for WWE  ECC done - will notify via Keren Gamez MD  4/30/2025  1:41 PM

## 2025-05-06 ENCOUNTER — MED REC SCAN ONLY (OUTPATIENT)
Facility: CLINIC | Age: 35
End: 2025-05-06

## 2025-05-06 DIAGNOSIS — F41.9 ANXIETY: ICD-10-CM

## 2025-05-07 NOTE — TELEPHONE ENCOUNTER
Requesting Alprazolam 0.25mg  Last OV: 4/3/25 Telemedicine  RTC: prn  Last Rx'd 4/17/24 #30 with 3 refills    Future Appointments   Date Time Provider Department Center   5/8/2025  2:00 PM Billy Biswas MD [facility] [address] [facility] [address]         Controlled med:  Rx pended and routed for approval/denial

## 2025-05-08 RX ORDER — ALPRAZOLAM 0.25 MG
0.25 TABLET ORAL DAILY PRN
Qty: 30 TABLET | Refills: 0 | Status: SHIPPED | OUTPATIENT
Start: 2025-05-08

## (undated) NOTE — LETTER
Giuliana Oconnor, :1990    CONSENT FOR PROCEDURE/SEDATION    1. I authorize the performance upon Giuliana Oconnor  the following: Polypectomy    2. I authorize Dr. Gary Gamez MD (and whomever is designated as the doctor’s assistant), to perform the above-mentioned procedures.    3. If any unforeseen conditions arise during this procedure calling for additional  procedures, operations, or medications (including anesthesia and blood transfusion), I further request and authorize the doctor to do whatever he/she deems advisable in my interest.    4. I consent to the taking and reproduction of any photographs in the course of this procedure for professional purposes.    5. I consent to the administration of such sedation as may be considered necessary or advisable by the physician responsible for this service, with the exception of ______________________________________________________    6. I have been informed by my doctor of the nature and purpose of this procedure sedation, possible alternative methods of treatment, risk involved and possible complications.    7. If I have a Do Not Resuscitate (DNR) order in place, the physician and I (or the individual authorized to consent on my behalf) will discuss and agree as to whether the Do Not Resuscitate (DNR) order will remain in effect or will be discontinued during the performance of the procedure and the applicable recovery period. If the Do Not Resuscitate (DNR) order is discontinued and is to be reinstated following the procedure/recovery period, the physician will determine when the applicable recovery period ends for purposes of reinstating the Do Not Resuscitate (DNR) order.    Signature of Patient:_______________________________________________    Signature of person authorized to consent for patient:  _______________________________________________________________    Relationship to patient:  ____________________________________________    Witness: _________________________________________ Date:___________     Physician Signature: _______________________________ Date:___________

## (undated) NOTE — LETTER
Giuliana Oconnor, :1990    CONSENT FOR PROCEDURE/SEDATION    1. I authorize the performance upon Giuliana Oconnor  the following: Colposcopy with biopsy and Endocervical curettage    2. I authorize Dr. Gary Gamez MD (and whomever is designated as the doctor’s assistant), to perform the above-mentioned procedures.    3. If any unforeseen conditions arise during this procedure calling for additional  procedures, operations, or medications (including anesthesia and blood transfusion), I further request and authorize the doctor to do whatever he/she deems advisable in my interest.    4. I consent to the taking and reproduction of any photographs in the course of this procedure for professional purposes.    5. I consent to the administration of such sedation as may be considered necessary or advisable by the physician responsible for this service, with the exception of ______________________________________________________    6. I have been informed by my doctor of the nature and purpose of this procedure sedation, possible alternative methods of treatment, risk involved and possible complications.    7. If I have a Do Not Resuscitate (DNR) order in place, the physician and I (or the individual authorized to consent on my behalf) will discuss and agree as to whether the Do Not Resuscitate (DNR) order will remain in effect or will be discontinued during the performance of the procedure and the applicable recovery period. If the Do Not Resuscitate (DNR) order is discontinued and is to be reinstated following the procedure/recovery period, the physician will determine when the applicable recovery period ends for purposes of reinstating the Do Not Resuscitate (DNR) order.    Signature of Patient:_______________________________________________    Signature of person authorized to consent for patient:  _______________________________________________________________    Relationship to  patient: ____________________________________________    Witness: _________________________________________ Date:___________     Physician Signature: _______________________________ Date:___________